# Patient Record
Sex: FEMALE | Race: OTHER | Employment: FULL TIME | URBAN - NONMETROPOLITAN AREA
[De-identification: names, ages, dates, MRNs, and addresses within clinical notes are randomized per-mention and may not be internally consistent; named-entity substitution may affect disease eponyms.]

---

## 2021-01-09 ENCOUNTER — OFFICE VISIT (OUTPATIENT)
Dept: PRIMARY CARE CLINIC | Age: 41
End: 2021-01-09
Payer: COMMERCIAL

## 2021-01-09 VITALS
BODY MASS INDEX: 40.05 KG/M2 | DIASTOLIC BLOOD PRESSURE: 78 MMHG | TEMPERATURE: 98.8 F | RESPIRATION RATE: 16 BRPM | OXYGEN SATURATION: 100 % | HEART RATE: 98 BPM | SYSTOLIC BLOOD PRESSURE: 105 MMHG | HEIGHT: 60 IN | WEIGHT: 204 LBS

## 2021-01-09 DIAGNOSIS — M62.830 BACK SPASM: Primary | ICD-10-CM

## 2021-01-09 PROCEDURE — 99203 OFFICE O/P NEW LOW 30 MIN: CPT | Performed by: FAMILY MEDICINE

## 2021-01-09 RX ORDER — CYCLOBENZAPRINE HCL 5 MG
5 TABLET ORAL NIGHTLY PRN
Qty: 30 TABLET | Refills: 0 | Status: SHIPPED | OUTPATIENT
Start: 2021-01-09 | End: 2021-08-09

## 2021-01-09 RX ORDER — PREDNISONE 20 MG/1
20 TABLET ORAL 2 TIMES DAILY
Qty: 10 TABLET | Refills: 0 | Status: SHIPPED | OUTPATIENT
Start: 2021-01-09 | End: 2021-01-14

## 2021-01-09 ASSESSMENT — PATIENT HEALTH QUESTIONNAIRE - PHQ9
SUM OF ALL RESPONSES TO PHQ QUESTIONS 1-9: 0
1. LITTLE INTEREST OR PLEASURE IN DOING THINGS: 0
SUM OF ALL RESPONSES TO PHQ QUESTIONS 1-9: 0
2. FEELING DOWN, DEPRESSED OR HOPELESS: 0
SUM OF ALL RESPONSES TO PHQ9 QUESTIONS 1 & 2: 0

## 2021-01-09 ASSESSMENT — ENCOUNTER SYMPTOMS
BACK PAIN: 1
COUGH: 0
WHEEZING: 0
CHEST TIGHTNESS: 0
BOWEL INCONTINENCE: 0
SHORTNESS OF BREATH: 0

## 2021-01-09 NOTE — PATIENT INSTRUCTIONS
Patient Education        Back Stretches: Exercises  Introduction  Here are some examples of exercises for stretching your back. Start each exercise slowly. Ease off the exercise if you start to have pain. Your doctor or physical therapist will tell you when you can start these exercises and which ones will work best for you. How to do the exercises  Overhead stretch   1. Stand comfortably with your feet shoulder-width apart. 2. Looking straight ahead, raise both arms over your head and reach toward the ceiling. Do not allow your head to tilt back. 3. Hold for 15 to 30 seconds, then lower your arms to your sides. 4. Repeat 2 to 4 times. Side stretch   1. Stand comfortably with your feet shoulder-width apart. 2. Raise one arm over your head, and then lean to the other side. 3. Slide your hand down your leg as you let the weight of your arm gently stretch your side muscles. Hold for 15 to 30 seconds. 4. Repeat 2 to 4 times on each side. Press-up   1. Lie on your stomach, supporting your body with your forearms. 2. Press your elbows down into the floor to raise your upper back. As you do this, relax your stomach muscles and allow your back to arch without using your back muscles. As your press up, do not let your hips or pelvis come off the floor. 3. Hold for 15 to 30 seconds, then relax. 4. Repeat 2 to 4 times. Relax and rest   1. Lie on your back with a rolled towel under your neck and a pillow under your knees. Extend your arms comfortably to your sides. 2. Relax and breathe normally. 3. Remain in this position for about 10 minutes. 4. If you can, do this 2 or 3 times each day. Follow-up care is a key part of your treatment and safety. Be sure to make and go to all appointments, and call your doctor if you are having problems. It's also a good idea to know your test results and keep a list of the medicines you take. Where can you learn more? Go to https://chpepiceweb.6fusion. org and sign in to your [employer] account. Enter Q184 in the M-SIXhire box to learn more about \"Back Stretches: Exercises. \"     If you do not have an account, please click on the \"Sign Up Now\" link. Current as of: March 2, 2020               Content Version: 12.6  © 9042-6324 [employer]. Care instructions adapted under license by [facility] ([facility]). If you have questions about a medical condition or this instruction, always ask your healthcare professional. [address] any warranty or liability for your use of this information. Patient Education        Acute Low Back Pain: Exercises  Introduction  Here are some examples of typical rehabilitation exercises for your condition. Start each exercise slowly. Ease off the exercise if you start to have pain. Your doctor or physical therapist will tell you when you can start these exercises and which ones will work best for you. When you are not being active, find a comfortable position for rest. Some people are comfortable on the floor or a medium-firm bed with a small pillow under their head and another under their knees. Some people prefer to lie on their side with a pillow between their knees. Don't stay in one position for too long. Take short walks (10 to 20 minutes) every 2 to 3 hours. Avoid slopes, hills, and stairs until you feel better. Walk only distances you can manage without pain, especially leg pain. How to do the exercises  Back stretches   1. Get down on your hands and knees on the floor. 2. Relax your head and allow it to droop. Round your back up toward the ceiling until you feel a nice stretch in your upper, middle, and lower back. Hold this stretch for as long as it feels comfortable, or about 15 to 30 seconds. 3. Return to the starting position with a flat back while you are on your hands and knees. 4. Let your back sway by pressing your stomach toward the floor. Lift your buttocks toward the ceiling. 5. Hold this position for 15 to 30 seconds. 6. Repeat 2 to 4 times. Follow-up care is a key part of your treatment and safety. Be sure to make and go to all appointments, and call your doctor if you are having problems. It's also a good idea to know your test results and keep a list of the medicines you take. Where can you learn more? Go to https://[email or web address].Hepregen. org and sign in to your [employer] account. Enter E576 in the [employer]onoCloud box to learn more about \"Acute Low Back Pain: Exercises. \"     If you do not have an account, please click on the \"Sign Up Now\" link. Current as of: March 2, 2020               Content Version: 12.6  © 1898-8729 [employer]. Care instructions adapted under license by [facility] ([facility]). If you have questions about a medical condition or this instruction, always ask your healthcare professional. [address] any warranty or liability for your use of this information.

## 2021-01-09 NOTE — PROGRESS NOTES
[address] [place] [address]  Dept: [phone]  Dept Fax: [phone]  Loc: [phone]    Amber King is a 47 y.o. female who presents today for her medical conditions/complaints as noted below. Amber King is c/o of   Chief Complaint   Patient presents with    Back Pain     Getting up out of a massage chair and could hardly get up afterwards. HPI:     Here today for back pain. Back Pain  This is a chronic problem. The current episode started yesterday (flared up yesterday). The problem occurs constantly. The pain is present in the thoracic spine and lumbar spine. The pain does not radiate. The pain is at a severity of 5/10. The pain is moderate. The symptoms are aggravated by lying down and position. Stiffness is present all day. Associated symptoms include numbness (in arms) and tingling (in arms). Pertinent negatives include no bladder incontinence, bowel incontinence, chest pain, fever, leg pain, paresis, paresthesias or weakness. Risk factors include obesity. She has tried NSAIDs (tylenol pm) for the symptoms. She works a machine at [employer]    History reviewed. No pertinent past medical history. Social History     Tobacco Use    Smoking status: Current Every Day Smoker     Packs/day: 0.50     Types: Cigarettes    Smokeless tobacco: Never Used   Substance Use Topics    Alcohol use: Not on file     Current Outpatient Medications   Medication Sig Dispense Refill    predniSONE (DELTASONE) 20 MG tablet Take 1 tablet by mouth 2 times daily for 5 days 10 tablet 0    cyclobenzaprine (FLEXERIL) 5 MG tablet Take 1 tablet by mouth nightly as needed for Muscle spasms 30 tablet 0     No current facility-administered medications for this visit.            Allergies   Allergen Reactions    Penicillins Hives, Shortness Of Breath and Swelling       Subjective:     Review of Systems Constitutional: Negative for activity change, appetite change, chills, fatigue and fever. Respiratory: Negative for cough, chest tightness, shortness of breath and wheezing. Cardiovascular: Negative for chest pain, palpitations and leg swelling. Gastrointestinal: Negative for bowel incontinence. Genitourinary: Negative for bladder incontinence and difficulty urinating. Musculoskeletal: Positive for back pain. Neurological: Positive for tingling (in arms) and numbness (in arms). Negative for dizziness, syncope, weakness, light-headedness and paresthesias. Objective:      Physical Exam  Vitals signs and nursing note reviewed. Constitutional:       General: She is not in acute distress. Appearance: She is well-developed. Eyes:      Conjunctiva/sclera: Conjunctivae normal.   Neck:      Thyroid: No thyroid mass. Cardiovascular:      Rate and Rhythm: Normal rate and regular rhythm. Heart sounds: Normal heart sounds. No murmur. Pulmonary:      Effort: Pulmonary effort is normal. No respiratory distress. Breath sounds: Normal breath sounds. No wheezing or rales. Abdominal:      General: Bowel sounds are normal. There is no distension. Palpations: Abdomen is soft. Tenderness: There is no abdominal tenderness. There is no guarding. Musculoskeletal: Normal range of motion. Cervical back: She exhibits spasm. Lumbar back: She exhibits spasm. She exhibits normal range of motion, no tenderness, no bony tenderness, no swelling and no edema. Comments: Modified straight leg raise test was negative bilaterally   Skin:     General: Skin is warm and dry. Findings: No rash. Neurological:      Mental Status: She is alert and oriented to person, place, and time. Sensory: No sensory deficit.       Coordination: Coordination normal.      Gait: Gait normal.      Deep Tendon Reflexes:      Reflex Scores: Bicep reflexes are 2+ on the right side and 2+ on the left side. Patellar reflexes are 2+ on the right side and 2+ on the left side. Psychiatric:         Behavior: Behavior normal.         Judgment: Judgment normal.       /78 (Site: Right Upper Arm, Position: Sitting, Cuff Size: Large Adult)   Pulse 98   Temp 98.8 °F (37.1 °C) (Temporal)   Resp 16   Ht 5' (1.524 m)   Wt 204 lb (92.5 kg)   SpO2 100%   BMI 39.84 kg/m²     Assessment:       Diagnosis Orders   1. Back spasm               Plan:        Back spasm: new; I explained that I do not like to treat back pain with narcotics. I recommended steriods, heat, ice and back exercises. she was given back exercises to take home. I also recommended an occasional muscle relaxer at bedtime if she needs it. I also talked about the importance of good posture and staying active. Return if symptoms worsen or fail to improve. Orders Placed This Encounter   Medications    predniSONE (DELTASONE) 20 MG tablet     Sig: Take 1 tablet by mouth 2 times daily for 5 days     Dispense:  10 tablet     Refill:  0    cyclobenzaprine (FLEXERIL) 5 MG tablet     Sig: Take 1 tablet by mouth nightly as needed for Muscle spasms     Dispense:  30 tablet     Refill:  0       Patientgiven educational materials - see patient instructions. Discussed use, benefit,and side effects of prescribed medications. All patient questions answered. Ptvoiced understanding. Reviewed health maintenance. Instructed to continue currentmedications, diet and exercise. Patient agreed with treatment plan. Follow up asdirected.      Electronically signed by Pearl Baird MD on 1/9/2021 at 2:21 PM

## 2021-03-02 ENCOUNTER — APPOINTMENT (OUTPATIENT)
Dept: CT IMAGING | Age: 41
End: 2021-03-02
Payer: COMMERCIAL

## 2021-03-02 ENCOUNTER — OFFICE VISIT (OUTPATIENT)
Dept: PRIMARY CARE CLINIC | Age: 41
End: 2021-03-02
Payer: COMMERCIAL

## 2021-03-02 ENCOUNTER — HOSPITAL ENCOUNTER (EMERGENCY)
Age: 41
Discharge: HOME | End: 2021-03-02
Attending: EMERGENCY MEDICINE
Payer: COMMERCIAL

## 2021-03-02 VITALS
BODY MASS INDEX: 40.25 KG/M2 | OXYGEN SATURATION: 98 % | HEIGHT: 60 IN | SYSTOLIC BLOOD PRESSURE: 108 MMHG | TEMPERATURE: 97.5 F | DIASTOLIC BLOOD PRESSURE: 68 MMHG | RESPIRATION RATE: 16 BRPM | HEART RATE: 84 BPM | WEIGHT: 205 LBS

## 2021-03-02 VITALS
WEIGHT: 205 LBS | DIASTOLIC BLOOD PRESSURE: 90 MMHG | HEIGHT: 60 IN | RESPIRATION RATE: 14 BRPM | BODY MASS INDEX: 40.25 KG/M2 | TEMPERATURE: 98.7 F | SYSTOLIC BLOOD PRESSURE: 120 MMHG | OXYGEN SATURATION: 98 % | HEART RATE: 78 BPM

## 2021-03-02 DIAGNOSIS — R11.10 VOMITING IN ADULT: Primary | ICD-10-CM

## 2021-03-02 DIAGNOSIS — R11.2 NAUSEA AND VOMITING, INTRACTABILITY OF VOMITING NOT SPECIFIED, UNSPECIFIED VOMITING TYPE: Primary | ICD-10-CM

## 2021-03-02 DIAGNOSIS — K29.00 ACUTE GASTRITIS, PRESENCE OF BLEEDING UNSPECIFIED, UNSPECIFIED GASTRITIS TYPE: ICD-10-CM

## 2021-03-02 LAB
-: ABNORMAL
ABSOLUTE EOS #: 0.26 K/UL (ref 0–0.44)
ABSOLUTE IMMATURE GRANULOCYTE: 0.05 K/UL (ref 0–0.3)
ABSOLUTE LYMPH #: 3.97 K/UL (ref 1.1–3.7)
ABSOLUTE MONO #: 1 K/UL (ref 0.1–1.2)
ALBUMIN SERPL-MCNC: 4.3 G/DL (ref 3.5–5.2)
ALBUMIN/GLOBULIN RATIO: 1.7 (ref 1–2.5)
ALP BLD-CCNC: 67 U/L (ref 35–104)
ALT SERPL-CCNC: 22 U/L (ref 5–33)
AMORPHOUS: ABNORMAL
AMYLASE: 44 U/L (ref 28–100)
ANION GAP SERPL CALCULATED.3IONS-SCNC: 10 MMOL/L (ref 9–17)
AST SERPL-CCNC: 19 U/L
BACTERIA: ABNORMAL
BASOPHILS # BLD: 0 % (ref 0–2)
BASOPHILS ABSOLUTE: 0.04 K/UL (ref 0–0.2)
BILIRUB SERPL-MCNC: 0.26 MG/DL (ref 0.3–1.2)
BILIRUBIN DIRECT: <0.08 MG/DL
BILIRUBIN URINE: NEGATIVE
BILIRUBIN, INDIRECT: ABNORMAL MG/DL (ref 0–1)
BUN BLDV-MCNC: 15 MG/DL (ref 6–20)
BUN/CREAT BLD: 15 (ref 9–20)
CALCIUM SERPL-MCNC: 9.6 MG/DL (ref 8.6–10.4)
CASTS UA: ABNORMAL /LPF (ref 0–2)
CHLORIDE BLD-SCNC: 102 MMOL/L (ref 98–107)
CO2: 29 MMOL/L (ref 20–31)
COLOR: ABNORMAL
COMMENT UA: ABNORMAL
CREAT SERPL-MCNC: 0.98 MG/DL (ref 0.5–0.9)
CRYSTALS, UA: ABNORMAL /HPF
DIFFERENTIAL TYPE: ABNORMAL
EOSINOPHILS RELATIVE PERCENT: 2 % (ref 1–4)
EPITHELIAL CELLS UA: ABNORMAL /HPF (ref 0–5)
GFR AFRICAN AMERICAN: >60 ML/MIN
GFR NON-AFRICAN AMERICAN: >60 ML/MIN
GFR SERPL CREATININE-BSD FRML MDRD: ABNORMAL ML/MIN/{1.73_M2}
GFR SERPL CREATININE-BSD FRML MDRD: ABNORMAL ML/MIN/{1.73_M2}
GLOBULIN: 2.6 G/DL (ref 1.5–3.8)
GLUCOSE BLD-MCNC: 106 MG/DL (ref 70–99)
GLUCOSE URINE: NEGATIVE
HCG QUALITATIVE: NEGATIVE
HCT VFR BLD CALC: 48.7 % (ref 36.3–47.1)
HEMOGLOBIN: 15.3 G/DL (ref 11.9–15.1)
IMMATURE GRANULOCYTES: 0 %
KETONES, URINE: NEGATIVE
LACTIC ACID: 0.7 MMOL/L (ref 0.5–2.2)
LEUKOCYTE ESTERASE, URINE: NEGATIVE
LIPASE: 71 U/L (ref 13–60)
LYMPHOCYTES # BLD: 33 % (ref 24–43)
MCH RBC QN AUTO: 29.7 PG (ref 25.2–33.5)
MCHC RBC AUTO-ENTMCNC: 31.4 G/DL (ref 25.2–33.5)
MCV RBC AUTO: 94.4 FL (ref 82.6–102.9)
MONOCYTES # BLD: 8 % (ref 3–12)
MUCUS: ABNORMAL
NITRITE, URINE: NEGATIVE
NRBC AUTOMATED: 0 PER 100 WBC
OTHER OBSERVATIONS UA: ABNORMAL
PDW BLD-RTO: 12.6 % (ref 11.8–14.4)
PH UA: 7.5 (ref 5–6)
PLATELET # BLD: 279 K/UL (ref 138–453)
PLATELET ESTIMATE: ABNORMAL
PMV BLD AUTO: 9.7 FL (ref 8.1–13.5)
POTASSIUM SERPL-SCNC: 3.3 MMOL/L (ref 3.7–5.3)
PROTEIN UA: NEGATIVE
RBC # BLD: 5.16 M/UL (ref 3.95–5.11)
RBC # BLD: ABNORMAL 10*6/UL
RBC UA: ABNORMAL /HPF (ref 0–4)
RENAL EPITHELIAL, UA: ABNORMAL /HPF
SEG NEUTROPHILS: 57 % (ref 36–65)
SEGMENTED NEUTROPHILS ABSOLUTE COUNT: 6.78 K/UL (ref 1.5–8.1)
SODIUM BLD-SCNC: 141 MMOL/L (ref 135–144)
SPECIFIC GRAVITY UA: 1.01 (ref 1.01–1.02)
TOTAL PROTEIN: 6.9 G/DL (ref 6.4–8.3)
TRICHOMONAS: ABNORMAL
TROPONIN INTERP: NORMAL
TROPONIN T: NORMAL NG/ML
TROPONIN, HIGH SENSITIVITY: <6 NG/L (ref 0–14)
TURBIDITY: ABNORMAL
URINE HGB: NEGATIVE
UROBILINOGEN, URINE: NORMAL
WBC # BLD: 12.1 K/UL (ref 3.5–11.3)
WBC # BLD: ABNORMAL 10*3/UL
WBC UA: ABNORMAL /HPF (ref 0–4)
YEAST: ABNORMAL

## 2021-03-02 PROCEDURE — 93005 ELECTROCARDIOGRAM TRACING: CPT | Performed by: EMERGENCY MEDICINE

## 2021-03-02 PROCEDURE — 85025 COMPLETE CBC W/AUTO DIFF WBC: CPT

## 2021-03-02 PROCEDURE — 6360000002 HC RX W HCPCS: Performed by: EMERGENCY MEDICINE

## 2021-03-02 PROCEDURE — 84484 ASSAY OF TROPONIN QUANT: CPT

## 2021-03-02 PROCEDURE — 6360000004 HC RX CONTRAST MEDICATION: Performed by: EMERGENCY MEDICINE

## 2021-03-02 PROCEDURE — 83605 ASSAY OF LACTIC ACID: CPT

## 2021-03-02 PROCEDURE — 80048 BASIC METABOLIC PNL TOTAL CA: CPT

## 2021-03-02 PROCEDURE — 2709999900 CT CHEST ABDOMEN PELVIS W CONTRAST

## 2021-03-02 PROCEDURE — 81001 URINALYSIS AUTO W/SCOPE: CPT

## 2021-03-02 PROCEDURE — 96375 TX/PRO/DX INJ NEW DRUG ADDON: CPT

## 2021-03-02 PROCEDURE — 83690 ASSAY OF LIPASE: CPT

## 2021-03-02 PROCEDURE — 96374 THER/PROPH/DIAG INJ IV PUSH: CPT

## 2021-03-02 PROCEDURE — 80076 HEPATIC FUNCTION PANEL: CPT

## 2021-03-02 PROCEDURE — 99284 EMERGENCY DEPT VISIT MOD MDM: CPT

## 2021-03-02 PROCEDURE — 82150 ASSAY OF AMYLASE: CPT

## 2021-03-02 PROCEDURE — 2580000003 HC RX 258: Performed by: EMERGENCY MEDICINE

## 2021-03-02 PROCEDURE — 84703 CHORIONIC GONADOTROPIN ASSAY: CPT

## 2021-03-02 RX ORDER — OMEPRAZOLE 20 MG/1
20 CAPSULE, DELAYED RELEASE ORAL DAILY
COMMUNITY

## 2021-03-02 RX ORDER — ONDANSETRON 4 MG/1
4 TABLET, ORALLY DISINTEGRATING ORAL EVERY 8 HOURS PRN
Qty: 20 TABLET | Refills: 0 | Status: SHIPPED | OUTPATIENT
Start: 2021-03-02

## 2021-03-02 RX ORDER — 0.9 % SODIUM CHLORIDE 0.9 %
1000 INTRAVENOUS SOLUTION INTRAVENOUS ONCE
Status: COMPLETED | OUTPATIENT
Start: 2021-03-02 | End: 2021-03-02

## 2021-03-02 RX ORDER — MORPHINE SULFATE 4 MG/ML
4 INJECTION, SOLUTION INTRAMUSCULAR; INTRAVENOUS ONCE
Status: COMPLETED | OUTPATIENT
Start: 2021-03-02 | End: 2021-03-02

## 2021-03-02 RX ORDER — ONDANSETRON 2 MG/ML
4 INJECTION INTRAMUSCULAR; INTRAVENOUS ONCE
Status: COMPLETED | OUTPATIENT
Start: 2021-03-02 | End: 2021-03-02

## 2021-03-02 RX ADMIN — MORPHINE SULFATE 4 MG: 4 INJECTION, SOLUTION INTRAMUSCULAR; INTRAVENOUS at 15:25

## 2021-03-02 RX ADMIN — IOPAMIDOL 100 ML: 755 INJECTION, SOLUTION INTRAVENOUS at 15:42

## 2021-03-02 RX ADMIN — SODIUM CHLORIDE 1000 ML: 9 INJECTION, SOLUTION INTRAVENOUS at 15:22

## 2021-03-02 RX ADMIN — ONDANSETRON 4 MG: 2 INJECTION INTRAMUSCULAR; INTRAVENOUS at 15:22

## 2021-03-02 ASSESSMENT — PAIN DESCRIPTION - LOCATION: LOCATION: THROAT

## 2021-03-02 ASSESSMENT — PAIN SCALES - GENERAL: PAINLEVEL_OUTOF10: 6

## 2021-03-02 ASSESSMENT — PAIN DESCRIPTION - ONSET: ONSET: ON-GOING

## 2021-03-02 NOTE — PROGRESS NOTES
Patient stated that she started vomitting bright red blood and it has now turned to a dark brown/black color. Says it hurts from her throat down to stomach and it is painful when she does have a bowel movement. Patient states she has not taken the omeprazole for a few days since she cannot keep anything down.

## 2021-03-02 NOTE — PROGRESS NOTES
[facility]             [address] [facility], [facility] [place], [address]                        Telephone [phone]             Fax [phone]       Wolf Oglesby  1980  MRN:  [id number]  Date of visit:  3/2/2021     Subjective:  Wolf Oglesby is a 47 y.o. female who presented to [facility] 3/2/2021 with complaints of:  Nausea & Vomiting (been vomitting blood 2 days ago, burning in stomach since last week)      She states that she has had abdominal pain for about a week. She has had nausea and vomiting for the past 2 days. She has had bloody emesis. She has been unable to eat or drink. Objective:  Vitals:    03/02/21 1330   BP: 108/68   Pulse: 84   Resp: 16   Temp: 97.5 °F (36.4 °C)   SpO2: 98%         Assessment and Plan:  Nausea and vomiting, intractability of vomiting not specified, unspecified vomiting type     I discussed with the patient that she would benefit from evaluation at the emergency room. The patient was in agreement, and she was transported by Urgent Care staff to [facility] ER.

## 2021-03-02 NOTE — ED PROVIDER NOTES
[address]      Pt Name: Wolf Oglesby  MRN: [id number]  [place] 1980  Date of evaluation: 3/2/2021      CHIEF COMPLAINT       Chief Complaint   Patient presents with    Hematemesis     pt states has been vomiting for 2 days, started having bloody vomit yesterday         HISTORY OF PRESENT ILLNESS      The patient presents with vomiting. She says she vomited some blood yesterday but not today. She said she vomited all day yesterday. The patient is a regular consumer of cannabis every night. She says that she feels a burning discomfort in her chest into the abdomen. She says it feels like heartburn. She has not noted any blood in her stool. She has not had a fever. She denies shortness of breath. Nothing makes her symptoms better or worse otherwise. REVIEW OF SYSTEMS       All systems reviewed and negative unless noted in HPI. The patient denies fever or constitutional symptoms. Denies vision change. Denies any sore throat or rhinorrhea. Denies any neck pain or stiffness. Denies shortness of breath. Vomiting with epigastric pain into the chest as noted in HPI. Denies any dysuria. Denies urinary frequency or hematuria. Denies musculoskeletal injury or pain. Denies any weakness, numbness or focal neurologic deficit. Denies any skin rash or edema. No recent psychiatric issues. Uses cannabis. No easy bruising or bleeding. Denies any polyuria, polydypsia or history of immunocompromise. PAST MEDICAL HISTORY    has no past medical history on file. SURGICAL HISTORY      has no past surgical history on file.     CURRENT MEDICATIONS       Previous Medications    CYCLOBENZAPRINE (FLEXERIL) 5 MG TABLET    Take 1 tablet by mouth nightly as needed for Muscle spasms    OMEPRAZOLE (PRILOSEC) 20 MG DELAYED RELEASE CAPSULE    Take 20 mg by mouth daily Took a few days ago because she cant keep anything down       ALLERGIES     is allergic to penicillins. FAMILY HISTORY     has no family status information on file. family history is not on file. SOCIAL HISTORY      reports that she has been smoking cigarettes. She started smoking about 21 years ago. She has been smoking about 0.50 packs per day for the past 0.00 years. She has never used smokeless tobacco. She reports current drug use. Drug: Marijuana. She reports that she does not drink alcohol. PHYSICAL EXAM     INITIAL VITALS:  height is 5' (1.524 m) and weight is 205 lb (93 kg). Her tympanic temperature is 98.7 °F (37.1 °C). Her blood pressure is 111/87 and her pulse is 78. Her respiration is 14 and oxygen saturation is 100%. The patient is alert and oriented, in no apparent distress. HEENT is atraumatic. Pupils are PERRL at 4 mm with normal extraocular motion. Mucous membranes moist.    Neck is supple. Heart sounds regular rate and rhythm with no gallops, murmurs, or rubs. Lungs clear, no wheezes, rales or rhonchi. Abdomen: soft, with mild discomfort in the epigastric area to palpation. No pulsatile mass. Normal bowel sounds are noted. No rebound or guarding. Musculoskeletal exam shows no evidence of trauma. Normal distal pulses in all extremities. Skin: no rash or edema. Neurological exam reveals cranial nerves 2 through 12 grossly intact. Patient has equal  and normal deep tendon reflexes. Psychiatric: no hallucinations or suicidal ideation. Lymphatics.:  No lymphadenopathy. DIFFERENTIAL DIAGNOSIS/ MDM:     Gastritis, esophagitis, vomiting, cyclic vomiting syndrome, dehydration, ACS    DIAGNOSTIC RESULTS     EKG: All EKG's are interpreted by the Emergency Department Physician who either signs or Co-signs this chart in the absence of a cardiologist.    Sinus 71 with no ischemia. Axis 57, , , . No old to compare.     RADIOLOGY:   I reviewed the radiologist interpretations:  CT CHEST ABDOMEN PELVIS W CONTRAST   Final Result   1. No acute infective or inflammatory process in the chest abdomen or pelvis. 2. A 1 cm focal sclerosis in T11 vertebral body probably bone island. This   may be further confirmed with nuclear medicine bone scan if deemed clinically   necessary.               CT CHEST ABDOMEN PELVIS W CONTRAST (Final result)  Result time 03/02/21 16:06:57  Final result by Patrick Grant MD (03/02/21 16:06:57)                Impression:    1. No acute infective or inflammatory process in the chest abdomen or pelvis. 2. A 1 cm focal sclerosis in T11 vertebral body probably bone island.  This   may be further confirmed with nuclear medicine bone scan if deemed clinically   necessary. Narrative:    EXAMINATION:   CT OF THE CHEST, ABDOMEN, AND PELVIS WITH CONTRAST 3/2/2021 3:42 pm     TECHNIQUE:   CT of the chest, abdomen and pelvis was performed with the administration of   intravenous contrast. Multiplanar reformatted images are provided for review. Dose modulation, iterative reconstruction, and/or weight based adjustment of   the mA/kV was utilized to reduce the radiation dose to as low as reasonably   achievable. COMPARISON:   No priors     HISTORY:   ORDERING SYSTEM PROVIDED HISTORY: vomiting with pain in chest and abdomen   TECHNOLOGIST PROVIDED HISTORY:   vomiting with pain in chest and abdomen     Decision Support Exception->Emergency Medical Condition (MA)   Reason for Exam: vomiting with pain in chest and abdomen   Acuity: Acute   Type of Exam: Initial     FINDINGS:     Chest:     Mediastinum:  Normal cardiac size.  Aorta enhances normally and is normal in   caliber.  The main pulmonary arteries are grossly normal.  No significant   mediastinal, hilar or axillary lymphadenopathy.  Thyroid gland shows no   significant abnormalities. Esophagus shows no significant abnormalities.  No   pneumomediastinum. Lungs/pleura:  There are no significant nodules or masses.  No focal consolidation.   There is no pleural effusion or pneumothorax.  Normal   tracheobronchial tree. Soft Tissues/Bones: 1 cm sclerotic focus in T11 vertebral body likely bone   island. Abdomen/Pelvis:     Organs: The liver, spleen, pancreas, kidneys and adrenal glands show no significant   abnormality.  Gallbladder shows no significant abnormality. GI/Bowel: There is limited evaluation due to absence of oral contrast.     The stomach shows no focal lesions.  Small bowel loops normal in caliber   showing no focal abnormalities. Normal appendix. Evaluation of the colon shows no acute process. Pelvis: Pelvic organs unremarkable. Peritoneum/Retroperitoneum: No free intraperitoneal fluid or significant   lymphadenopathy.  Vascular structures enhance satisfactorily. Bones/Soft Tissues: No acute abnormality of the bones.  The superficial soft   tissues show no significant abnormalities.                      LABS:  Results for orders placed or performed during the hospital encounter of 03/02/21   Lactic Acid   Result Value Ref Range    Lactic Acid 0.7 0.5 - 2.2 mmol/L   CBC Auto Differential   Result Value Ref Range    WBC 12.1 (H) 3.5 - 11.3 k/uL    RBC 5.16 (H) 3.95 - 5.11 m/uL    Hemoglobin 15.3 (H) 11.9 - 15.1 g/dL    Hematocrit 48.7 (H) 36.3 - 47.1 %    MCV 94.4 82.6 - 102.9 fL    MCH 29.7 25.2 - 33.5 pg    MCHC 31.4 25.2 - 33.5 g/dL    RDW 12.6 11.8 - 14.4 %    Platelets 111 663 - 194 k/uL    MPV 9.7 8.1 - 13.5 fL    NRBC Automated 0.0 0.0 per 100 WBC    Differential Type NOT REPORTED     Seg Neutrophils 57 36 - 65 %    Lymphocytes 33 24 - 43 %    Monocytes 8 3 - 12 %    Eosinophils % 2 1 - 4 %    Basophils 0 0 - 2 %    Immature Granulocytes 0 0 %    Segs Absolute 6.78 1.50 - 8.10 k/uL    Absolute Lymph # 3.97 (H) 1.10 - 3.70 k/uL    Absolute Mono # 1.00 0.10 - 1.20 k/uL    Absolute Eos # 0.26 0.00 - 0.44 k/uL    Basophils Absolute 0.04 0.00 - 0.20 k/uL    Absolute Immature Granulocyte 0. 05 0.00 - 0.30 k/uL    WBC Morphology NOT REPORTED     RBC Morphology NOT REPORTED     Platelet Estimate NOT REPORTED    Basic Metabolic Panel   Result Value Ref Range    Glucose 106 (H) 70 - 99 mg/dL    BUN 15 6 - 20 mg/dL    CREATININE 0.98 (H) 0.50 - 0.90 mg/dL    Bun/Cre Ratio 15 9 - 20    Calcium 9.6 8.6 - 10.4 mg/dL    Sodium 141 135 - 144 mmol/L    Potassium 3.3 (L) 3.7 - 5.3 mmol/L    Chloride 102 98 - 107 mmol/L    CO2 29 20 - 31 mmol/L    Anion Gap 10 9 - 17 mmol/L    GFR Non-African American >60 >60 mL/min    GFR African American >60 >60 mL/min    GFR Comment          GFR Staging NOT REPORTED    Hepatic Function Panel   Result Value Ref Range    Albumin 4.3 3.5 - 5.2 g/dL    Alkaline Phosphatase 67 35 - 104 U/L    ALT 22 5 - 33 U/L    AST 19 <32 U/L    Total Bilirubin 0.26 (L) 0.3 - 1.2 mg/dL    Bilirubin, Direct <0.08 <0.31 mg/dL    Bilirubin, Indirect CANNOT BE CALCULATED 0.00 - 1.00 mg/dL    Total Protein 6.9 6.4 - 8.3 g/dL    Globulin 2.6 1.5 - 3.8 g/dL    Albumin/Globulin Ratio 1.7 1.0 - 2.5   Lipase   Result Value Ref Range    Lipase 71 (H) 13 - 60 U/L   Amylase   Result Value Ref Range    Amylase 44 28 - 100 U/L   Troponin   Result Value Ref Range    Troponin, High Sensitivity <6 0 - 14 ng/L    Troponin T NOT REPORTED <0.03 ng/mL    Troponin Interp NOT REPORTED    Urinalysis Reflex to Culture    Specimen: Urine, clean catch   Result Value Ref Range    Color, UA NOT REPORTED YELLOW    Turbidity UA NOT REPORTED CLEAR    Glucose, Ur NEGATIVE NEGATIVE    Bilirubin Urine NEGATIVE NEGATIVE    Ketones, Urine NEGATIVE NEGATIVE    Specific Gravity, UA 1.015 1.010 - 1.025    Urine Hgb NEGATIVE NEGATIVE    pH, UA 7.5 (H) 5.0 - 6.0    Protein, UA NEGATIVE NEGATIVE    Urobilinogen, Urine Normal Normal    Nitrite, Urine NEGATIVE NEGATIVE    Leukocyte Esterase, Urine NEGATIVE NEGATIVE    Urinalysis Comments NOT REPORTED    HCG Qualitative, Serum   Result Value Ref Range    hCG Qual NEGATIVE NEGATIVE Microscopic Urinalysis   Result Value Ref Range    -          WBC, UA 0 TO 4 0 - 4 /HPF    RBC, UA 0 TO 4 0 - 4 /HPF    Casts UA NOT REPORTED 0 - 2 /LPF    Crystals, UA NOT REPORTED None /HPF    Epithelial Cells UA 0 TO 4 0 - 5 /HPF    Renal Epithelial, UA NOT REPORTED 0 /HPF    Bacteria, UA 3+ (A) None    Mucus, UA NOT REPORTED None    Trichomonas, UA NOT REPORTED None    Amorphous, UA 3+ (A) None    Other Observations UA NOT REPORTED NOT REQ. Yeast, UA NOT REPORTED None   EKG 12 Lead   Result Value Ref Range    Ventricular Rate 71 BPM    Atrial Rate 71 BPM    P-R Interval 122 ms    QRS Duration 100 ms    Q-T Interval 404 ms    QTc Calculation (Bazett) 439 ms    P Axis 60 degrees    R Axis 57 degrees    T Axis 21 degrees         EMERGENCY DEPARTMENT COURSE:   Vitals:    Vitals:    03/02/21 1421   BP: 111/87   Pulse: 78   Resp: 14   Temp: 98.7 °F (37.1 °C)   TempSrc: Tympanic   SpO2: 100%   Weight: 205 lb (93 kg)   Height: 5' (1.524 m)     -------------------------  BP: 111/87, Temp: 98.7 °F (37.1 °C), Pulse: 78, Resp: 14      Re-evaluation Notes    The patient has had no further vomiting and is feeling improved. I will write for Zofran. I did discuss the she may wish to decrease her marijuana use. I given her referral to a surgeon and the PCP. She may continue her acid reducing medication that she is already prescribed. The patient is discharged in good condition. FINAL IMPRESSION      1. Vomiting in adult    2.  Acute gastritis, presence of bleeding unspecified, unspecified gastritis type          DISPOSITION/PLAN   DISPOSITION        Condition on Disposition    good    PATIENT REFERRED TO:  Josh Mckeon, DO  [address] [facility]  [phone]    In 2 days      Thais Simpson DO  [address] [address]  [phone]    In 2 days        DISCHARGE MEDICATIONS:  New Prescriptions    ONDANSETRON (ZOFRAN ODT) 4 MG DISINTEGRATING TABLET    Take 1 tablet by mouth every 8 hours as needed for Nausea       (Please note that portions of this note were completed with a voice recognition program.  Efforts were made to edit the dictations but occasionally words are mis-transcribed.)    Yen MD   Attending Emergency Physician         Michael Harrell MD  03/02/21 1916

## 2021-03-02 NOTE — LETTER
[facility]  [address]  Phone: [phone]               March 2, 2021    Patient: Amber King   YOB: 1980   Date of Visit: 3/2/2021       To Whom It May Concern:    Amber King was seen and treated in our emergency department on 3/2/2021. She may return to work on 03/04/21.       Sincerely,       Brandy Winkler MD         Signature:__________________________________

## 2021-03-03 LAB
EKG ATRIAL RATE: 71 BPM
EKG P AXIS: 60 DEGREES
EKG P-R INTERVAL: 122 MS
EKG Q-T INTERVAL: 404 MS
EKG QRS DURATION: 100 MS
EKG QTC CALCULATION (BAZETT): 439 MS
EKG R AXIS: 57 DEGREES
EKG T AXIS: 21 DEGREES
EKG VENTRICULAR RATE: 71 BPM

## 2021-03-09 ENCOUNTER — INITIAL CONSULT (OUTPATIENT)
Dept: SURGERY | Age: 41
End: 2021-03-09
Payer: COMMERCIAL

## 2021-03-09 VITALS
SYSTOLIC BLOOD PRESSURE: 126 MMHG | DIASTOLIC BLOOD PRESSURE: 80 MMHG | OXYGEN SATURATION: 95 % | HEART RATE: 87 BPM | BODY MASS INDEX: 39.62 KG/M2 | RESPIRATION RATE: 16 BRPM | HEIGHT: 60 IN | WEIGHT: 201.8 LBS

## 2021-03-09 DIAGNOSIS — K92.0 HEMATEMESIS WITH NAUSEA: ICD-10-CM

## 2021-03-09 DIAGNOSIS — R11.2 NAUSEA AND VOMITING, INTRACTABILITY OF VOMITING NOT SPECIFIED, UNSPECIFIED VOMITING TYPE: Primary | ICD-10-CM

## 2021-03-09 DIAGNOSIS — Z01.818 PRE-OP TESTING: Primary | ICD-10-CM

## 2021-03-09 PROCEDURE — 99203 OFFICE O/P NEW LOW 30 MIN: CPT | Performed by: SURGERY

## 2021-03-09 NOTE — PROGRESS NOTES
Subjective   Gisele Roche is a 47 y.o. female who presents today for dilation of nausea, vomiting and hematemesis. Patient states for approximately the past 3 to 4 weeks she has been having ongoing daily nausea and emesis. This seems to be caused by anything that she eats and states that at least on a couple occasions even just drinking water has caused her to have emesis. More recently on 3/2 the patient came to ER due to her symptoms and on that day also had an episode of hematemesis which she states started as bright red blood and started to become more dark. She had work-up in the ER that did not show any acute processes and she was discharged with Zofran. The patient has been on over-the-counter Prilosec as well as Tums as needed. Prior to 3 to 4 weeks ago she was not having any issues. Denies any changes in bowel movements. No unintended weight loss. Family history unavailable due to adoption. The patient does reports tobacco use both in the form of cigarettes and vaping. Does not use alcohol. The patient does smoke marijuana daily and states that she has been doing this for approximately 20 years. Other than eating she has not identified any other inciting causes of her symptoms. No recent sick contacts. No recent travel. No other known exposures. She does report some mild cramping pain in the left upper quadrant. Otherwise no abdominal pain. Past Medical History:   Diagnosis Date    ADHD     Asthma     Dysplasia of cervix     GERD (gastroesophageal reflux disease)     Hypoglycemia     PCOS (polycystic ovarian syndrome)     PTSD (post-traumatic stress disorder)        No past surgical history on file.     Current Outpatient Medications   Medication Sig Dispense Refill    omeprazole (PRILOSEC) 20 MG delayed release capsule Take 20 mg by mouth daily Took a few days ago because she cant keep anything down      ondansetron (ZOFRAN ODT) 4 MG disintegrating tablet Take 1 tablet by mouth every 8 hours as needed for Nausea 20 tablet 0    cyclobenzaprine (FLEXERIL) 5 MG tablet Take 1 tablet by mouth nightly as needed for Muscle spasms 30 tablet 0     No current facility-administered medications for this visit. Allergies   Allergen Reactions    Penicillins Hives, Shortness Of Breath and Swelling       No family history on file.     Social History     Socioeconomic History    Marital status: Single     Spouse name: Not on file    Number of children: Not on file    Years of education: Not on file    Highest education level: Not on file   Occupational History    Not on file   Social Needs    Financial resource strain: Not on file    Food insecurity     Worry: Not on file     Inability: Not on file    Transportation needs     Medical: Not on file     Non-medical: Not on file   Tobacco Use    Smoking status: Current Every Day Smoker     Packs/day: 0.50     Years: 0.00     Pack years: 0.00     Types: Cigarettes     Start date: 3/2/2000    Smokeless tobacco: Never Used   Substance and Sexual Activity    Alcohol use: Never     Frequency: Never    Drug use: Yes     Types: Marijuana    Sexual activity: Not on file   Lifestyle    Physical activity     Days per week: Not on file     Minutes per session: Not on file    Stress: Not on file   Relationships    Social connections     Talks on phone: Not on file     Gets together: Not on file     Attends Caodaism service: Not on file     Active member of club or organization: Not on file     Attends meetings of clubs or organizations: Not on file     Relationship status: Not on file    Intimate partner violence     Fear of current or ex partner: Not on file     Emotionally abused: Not on file     Physically abused: Not on file     Forced sexual activity: Not on file   Other Topics Concern    Not on file   Social History Narrative    Not on file       ROS:   Review of Systems - General ROS: negative  Psychological ROS: negative  Ophthalmic ROS: negative  ENT ROS: negative  Respiratory ROS: no cough, shortness of breath, or wheezing  Cardiovascular ROS: no chest pain or dyspnea on exertion  Gastrointestinal ROS: per HPI  Genito-Urinary ROS: no dysuria, trouble voiding, or hematuria  Musculoskeletal ROS: negative  Dermatological ROS: negative      Objective   Vitals:    03/09/21 1435   BP: 126/80   Pulse: 87   Resp: 16   SpO2: 95%     General:in no apparent distress and well developed and well nourished  Eyes: No gross abnormalities. Ears, Nose, Throat: hearing grossly normal bilaterally  Neck: neck supple and non tender without mass  Lungs: clear to auscultation without wheezes or rales   Heart: S1S2, no mumurs, RRR  Abdomen: soft, nontender, no HSM, no guarding, no rebound, no masses  Extremity: negative  Neuro: CN II-XII grossly intact      Assessment     3  57-year-old female with recent daily nausea and vomiting with 1 episode of hematemesis      Plan     1. At this point I do have concern for ulcer disease versus other acute gastric pathology. Recommending patient undergo EGD for further evaluation. Risks of the procedure including bleeding, infection, perforation, need for further surgery and anesthesia risk were discussed and consent is obtained. Patient will remain on her PPI therapy and can use Zofran as needed. 2.  I did at least start conversation about the role of chronic cannabinoid use as it relates to intractable nausea and vomiting with cannabinoid induced hyperemesis. As this is a diagnosis of exclusion will rule out other pathology before discussion further.     Electronically signed by Annalisa Nath DO on 3/9/2021 at 3:03 PM      (Please note that portions of this note were completed with a voice recognition program.  Efforts were made to edit the dictations but occasionally words are mis-transcribed.)

## 2021-03-09 NOTE — PATIENT INSTRUCTIONS
Patient Education   Patient Education        Upper GI Endoscopy: Before Your Procedure  What is an upper GI endoscopy? An upper gastrointestinal (or GI) endoscopy is a test that allows your doctor to look at the inside of your esophagus, stomach, and the first part of your small intestine, called the duodenum. The esophagus is the tube that carries food to your stomach. The doctor uses a thin, lighted tube that bends. It is called an endoscope, or scope. The doctor puts the tip of the scope in your mouth and gently moves it down your throat. The scope is a flexible video camera. The doctor looks at a monitor (like a TV set or a computer screen) as he or she moves the scope. A doctor may do this procedure to look for ulcers, tumors, infection, or bleeding. It also can be used to look for signs of acid backing up into your esophagus. This is called gastroesophageal reflux disease, or GERD. The doctor can use the scope to take a sample of tissue for study (a biopsy). The doctor also can use the scope to take out growths or stop bleeding. Follow-up care is a key part of your treatment and safety. Be sure to make and go to all appointments, and call your doctor if you are having problems. It's also a good idea to know your test results and keep a list of the medicines you take. How do you prepare for the procedure? Procedures can be stressful. This information will help you understand what you can expect. And it will help you safely prepare for your procedure. Preparing for the procedure    · Do not eat or drink anything for 6 to 8 hours before the test. An empty stomach helps your doctor see your stomach clearly during the test. It also reduces your chances of vomiting. If you vomit, there is a small risk that the vomit could enter your lungs.  (This is called aspiration.) If the test is done in an emergency, a tube may be inserted through your nose or mouth to empty your stomach.     · Do not take sucralfate (Carafate) or antacids on the day of the test. These medicines can make it hard for your doctor to see your upper GI tract.     · If your doctor tells you to, stop taking iron supplements 7 to 14 days before the test.     · Be sure you have someone to take you home. Anesthesia and pain medicine will make it unsafe for you to drive or get home on your own.     · Understand exactly what procedure is planned, along with the risks, benefits, and other options. · Tell your doctor ALL the medicines, vitamins, supplements, and herbal remedies you take. Some may increase the risk of problems during your procedure. Your doctor will tell you if you should stop taking any of them before the procedure and how soon to do it.     · If you take aspirin or some other blood thinner, ask your doctor if you should stop taking it before your procedure. Make sure that you understand exactly what your doctor wants you to do. These medicines increase the risk of bleeding.     · Make sure your doctor and the hospital have a copy of your advance directive. If you don't have one, you may want to prepare one. It lets others know your health care wishes. It's a good thing to have before any type of surgery or procedure. What happens on the day of the procedure? · Follow the instructions exactly about when to stop eating and drinking. If you don't, your procedure may be canceled. If your doctor told you to take your medicines on the day of the procedure, take them with only a sip of water.     · Take a bath or shower before you come in for your procedure. Do not apply lotions, perfumes, deodorants, or nail polish.     · Take off all jewelry and piercings. And take out contact lenses, if you wear them. At the hospital or surgery center   · Bring a picture ID.     · The test may take 15 to 30 minutes.     · The doctor may spray medicine on the back of your throat to numb it. You also will get medicine to prevent pain and to relax you.   · You will lie on your left side. The doctor will put the scope in your mouth and toward the back of your throat. The doctor will tell you when to swallow. This helps the scope move down your throat. You will be able to breathe normally. The doctor will move the scope down your esophagus into your stomach. The doctor also may look at the duodenum.     · If your doctor wants to take a sample of tissue for a biopsy, he or she may use small surgical tools, which are put into the scope, to cut off some tissue. You will not feel a biopsy, if one is taken. The doctor also can use the tools to stop bleeding or to do other treatments, if needed.     · You will stay at the hospital or surgery center for 1 to 2 hours until the medicine you were given wears off. What happens after an upper GI endoscopy? · After the test, you may belch and feel bloated for a while.     · You may have a tickling, dry throat or mouth. You may feel a bit hoarse, and you may have a mild sore throat. These symptoms may last several days. Throat lozenges and warm saltwater gargles can help relieve the throat symptoms.     · Don't drive or operate machinery for 12 hours after the test.     · Your doctor will tell you when you can go back to your usual diet and activities.     · Don't drink alcohol for 12 to 24 hours after the test.   When should you call your doctor? · You have questions or concerns.     · You don't understand how to prepare for your procedure.     · You become ill before the procedure (such as fever, flu, or a cold).     · You need to reschedule or have changed your mind about having the procedure. Where can you learn more? Go to [email or web address]://[email or web address] org and sign in to your PredictAd account. Enter P790 in the Wintegra box to learn more about \"Upper GI Endoscopy: Before Your Procedure. \"     If you do not have an account, please click on the \"Sign Up Now\" link.   Current as of: April 15, 2020               Content Version: 12.6  © 2006-2020 [employer]. Care instructions adapted under license by [facility] ([facility]). If you have questions about a medical condition or this instruction, always ask your healthcare professional. [address] any warranty or liability for your use of this information. Upper GI Endoscopy: What to Expect at [address] had an upper GI endoscopy. Your doctor used a thin, lighted tube that bends to look at the inside of your esophagus, your stomach, and the first part of the small intestine, called the duodenum. After you have an endoscopy, you will stay at the hospital or clinic for 1 to 2 hours. This will allow the medicine to wear off. You will be able to go home after your doctor or nurse checks to make sure that you're not having any problems. You may have to stay overnight if you had treatment during the test. You may have a sore throat for a day or two after the test.  This care sheet gives you a general idea about what to expect after the test.  How can you care for yourself at home? Activity   · Rest as much as you need to after you go home. · You should be able to go back to your usual activities the day after the test.  Diet   · Follow your doctor's directions for eating after the test.  · Drink plenty of fluids (unless your doctor has told you not to). Medications   · If you have a sore throat the day after the test, use an over-the-counter spray to numb your throat. Follow-up care is a key part of your treatment and safety. Be sure to make and go to all appointments, and call your doctor if you are having problems. It's also a good idea to know your test results and keep a list of the medicines you take. When should you call for help? Call 911 anytime you think you may need emergency care.  For example, call if:    · You passed out (lost consciousness).     · You have trouble breathing.     · You pass maroon or bloody stools. Call your doctor now or seek immediate medical care if:    · You have pain that does not get better after your take pain medicine.     · You have new or worse belly pain.     · You have blood in your stools.     · You are sick to your stomach and cannot keep fluids down.     · You have a fever.     · You cannot pass stools or gas. Watch closely for changes in your health, and be sure to contact your doctor if:    · Your throat still hurts after a day or two.     · You do not get better as expected. Where can you learn more? Go to https://Softec Internetpepiceweb.Rezolve. org and sign in to your BitMethod account. Enter D238 in the Fuzmo box to learn more about \"Upper GI Endoscopy: What to Expect at Home. \"     If you do not have an account, please click on the \"Sign Up Now\" link. Current as of: April 15, 2020               Content Version: 12.6  © 2006-2020 [employer]. Care instructions adapted under license by [facility] ([facility]). If you have questions about a medical condition or this instruction, always ask your healthcare professional. [place] [address] any warranty or liability for your use of this information.

## 2021-03-10 DIAGNOSIS — Z01.818 PRE-OP TESTING: Primary | ICD-10-CM

## 2021-03-15 LAB — HCG URINE: NEGATIVE

## 2021-03-23 ENCOUNTER — TELEPHONE (OUTPATIENT)
Dept: SURGERY | Age: 41
End: 2021-03-23

## 2021-06-15 ENCOUNTER — OFFICE VISIT (OUTPATIENT)
Dept: FAMILY MEDICINE CLINIC | Age: 41
End: 2021-06-15
Payer: COMMERCIAL

## 2021-06-15 VITALS
HEIGHT: 61 IN | RESPIRATION RATE: 16 BRPM | OXYGEN SATURATION: 98 % | BODY MASS INDEX: 33.27 KG/M2 | HEART RATE: 90 BPM | SYSTOLIC BLOOD PRESSURE: 110 MMHG | WEIGHT: 176.2 LBS | TEMPERATURE: 97.6 F | DIASTOLIC BLOOD PRESSURE: 70 MMHG

## 2021-06-15 DIAGNOSIS — R30.0 DYSURIA: ICD-10-CM

## 2021-06-15 DIAGNOSIS — N91.2 ABSENT MENSES: ICD-10-CM

## 2021-06-15 DIAGNOSIS — Z11.4 ENCOUNTER FOR SCREENING FOR HIV: ICD-10-CM

## 2021-06-15 DIAGNOSIS — Z11.59 NEED FOR HEPATITIS C SCREENING TEST: ICD-10-CM

## 2021-06-15 DIAGNOSIS — F17.200 SMOKER: ICD-10-CM

## 2021-06-15 DIAGNOSIS — Z13.31 POSITIVE DEPRESSION SCREENING: ICD-10-CM

## 2021-06-15 DIAGNOSIS — B07.0 PLANTAR WARTS: Primary | ICD-10-CM

## 2021-06-15 DIAGNOSIS — Z13.220 SCREENING FOR LIPID DISORDERS: ICD-10-CM

## 2021-06-15 DIAGNOSIS — L70.9 ACNE, UNSPECIFIED ACNE TYPE: ICD-10-CM

## 2021-06-15 DIAGNOSIS — F12.20 CANNABIS DEPENDENCE, DAILY USE (HCC): ICD-10-CM

## 2021-06-15 PROCEDURE — 99204 OFFICE O/P NEW MOD 45 MIN: CPT | Performed by: NURSE PRACTITIONER

## 2021-06-15 PROCEDURE — G8431 POS CLIN DEPRES SCRN F/U DOC: HCPCS | Performed by: NURSE PRACTITIONER

## 2021-06-15 RX ORDER — ACETAMINOPHEN 325 MG/1
650 TABLET ORAL EVERY 6 HOURS PRN
COMMUNITY

## 2021-06-15 RX ORDER — SENNA PLUS 8.6 MG/1
1 TABLET ORAL DAILY
COMMUNITY

## 2021-06-15 RX ORDER — SUCRALFATE 1 G/1
TABLET ORAL
COMMUNITY
Start: 2021-03-15

## 2021-06-15 ASSESSMENT — PATIENT HEALTH QUESTIONNAIRE - PHQ9
7. TROUBLE CONCENTRATING ON THINGS, SUCH AS READING THE NEWSPAPER OR WATCHING TELEVISION: 3
6. FEELING BAD ABOUT YOURSELF - OR THAT YOU ARE A FAILURE OR HAVE LET YOURSELF OR YOUR FAMILY DOWN: 2
SUM OF ALL RESPONSES TO PHQ9 QUESTIONS 1 & 2: 3
3. TROUBLE FALLING OR STAYING ASLEEP: 3
8. MOVING OR SPEAKING SO SLOWLY THAT OTHER PEOPLE COULD HAVE NOTICED. OR THE OPPOSITE, BEING SO FIGETY OR RESTLESS THAT YOU HAVE BEEN MOVING AROUND A LOT MORE THAN USUAL: 3
10. IF YOU CHECKED OFF ANY PROBLEMS, HOW DIFFICULT HAVE THESE PROBLEMS MADE IT FOR YOU TO DO YOUR WORK, TAKE CARE OF THINGS AT HOME, OR GET ALONG WITH OTHER PEOPLE: 2
1. LITTLE INTEREST OR PLEASURE IN DOING THINGS: 1
SUM OF ALL RESPONSES TO PHQ QUESTIONS 1-9: 20
4. FEELING TIRED OR HAVING LITTLE ENERGY: 3
5. POOR APPETITE OR OVEREATING: 3
SUM OF ALL RESPONSES TO PHQ QUESTIONS 1-9: 20
9. THOUGHTS THAT YOU WOULD BE BETTER OFF DEAD, OR OF HURTING YOURSELF: 0
SUM OF ALL RESPONSES TO PHQ QUESTIONS 1-9: 20
2. FEELING DOWN, DEPRESSED OR HOPELESS: 2

## 2021-06-15 NOTE — PATIENT INSTRUCTIONS
Patient Education        benzoyl peroxide topical  Pronunciation:  NEPTALI zoyl per OX omayra  Brand:  Acne-Clear, Benzac AC, BenzePrO, Benziq, Brevoxyl Acne Wash Kit, Clearskin, Fostex Wash 10%, NeoBenz Micro, Neutrogena Acne Mask, Oscion, Oxy Daily Wash, Oxy-10, Pacnex, PanOxyl, Persa-Gel, Riax, SoluCLENZ Rx, Romaine Kill  What is the most important information I should know about benzoyl peroxide topical?  Benzoyl peroxide can cause a rare but serious allergic reaction or severe skin irritation. Stop using this medicine and get emergency medical help if you have: hives, itching; difficult breathing, feeling light-headed; or swelling of your face, lips, tongue, or throat. What is benzoyl peroxide topical?  Benzoyl peroxide has an antibacterial effect. It also has a mild drying effect, which allows excess oils and dirt to be easily washed away from the skin. Benzoyl peroxide topical (for the skin) is used to treat acne. There are many brands and forms of benzoyl peroxide available. Not all brands are listed on this leaflet. Benzoyl peroxide topical may also be used for purposes not listed in this medication guide. What should I discuss with my healthcare provider before using benzoyl peroxide topical?  You should not use benzoyl peroxide if you are allergic to it, or if you have:  · very sensitive skin. Ask a doctor or pharmacist if this medicine is safe to use if you have any skin conditions or allergies. Ask a doctor before using this medicine if you are pregnant or breastfeeding. Do not use this medicine on a child without medical advice. How should I use benzoyl peroxide topical?  Benzoyl peroxide topical can cause a rare but serious allergic reaction or severe skin irritation. Before you start using this medicine, you may choose to apply a \"test dose\" to see if you have a reaction. Apply a very small amount of the medicine to 1 or 2 small acne areas every day for 3 days in a row.  If there is no reaction, begin using the full prescribed amount on the 4th day. Use exactly as directed on the label, or as prescribed by your doctor. Do not take by mouth. Topical medicine is for use only on the skin. Do not use on open wounds or on sunburned, windburned, dry, or irritated skin. Also avoid using benzoyl peroxide topical on wounds or on areas of eczema. Wait until these conditions have healed before using this medication. Wash your hands before and after applying this medication. Clean and pat dry the skin to be treated. Apply benzoyl peroxide in a thin layer and rub in gently. Read and carefully follow any Instructions for Use provided with your medicine. Ask your doctor or pharmacist if you do not understand these instructions. You may need to shake the medicine just before each use. Do not cover the treated skin area unless your doctor has told you to. Benzoyl peroxide may bleach hair or fabrics. Avoid allowing this medication to come into contact with your hair or clothing. It may take several weeks before your symptoms improve. Keep using the medication as directed and tell your doctor if your symptoms do not improve. Store at room temperature away from moisture and heat. What happens if I miss a dose? Apply the medicine as soon as you can, but skip the missed dose if it is almost time for your next dose. Do not apply two doses at one time. What happens if I overdose? Seek emergency medical attention or call the Poison Help line at 1-383.558.6284. What should I avoid while using benzoyl peroxide topical?  Rinse with water if this medicine gets in your eyes or mouth. Avoid using skin products that can cause irritation, such as harsh soaps, shampoos, hair coloring or permanent chemicals, hair removers or waxes, or skin products with alcohol, spices, astringents, or lime. Avoid exposure to sunlight or tanning beds.  Wear protective clothing and use sunscreen (SPF 30 or higher) when you are outdoors. What are the possible side effects of benzoyl peroxide topical?  Benzoyl peroxide topical can cause a rare but serious allergic reaction or severe skin irritation. These reactions may occur just a few minutes after you apply the medicine, or within a day or longer afterward. Stop using this medicine and get emergency medical help if you have signs of an allergic reaction: hives, itching; difficult breathing, feeling light-headed; swelling of your face, lips, tongue, or throat. Stop using benzoyl peroxide and call your doctor at once if you have any of these side effects on the treated skin:  · severe itching or burning;  · severe stinging or redness;  · swelling; or  · peeling. Common side effects may include:  · mild stinging or burning;  · itching or tingly feeling;  · skin dryness, peeling, or flaking; or  · redness or other irritation. This is not a complete list of side effects and others may occur. Call your doctor for medical advice about side effects. You may report side effects to FDA at 6-593-FDA-4434. What other drugs will affect benzoyl peroxide topical?  Applying benzoyl peroxide while you are also using tretinoin topical medicine may cause severe skin irritation. Brands that contain tretinoin include Avita, Renova, Retin-A, Tretin-X, and others. Medicine used on the skin is not likely to be affected by other drugs you use. But many drugs can interact with each other. Tell each of your health care providers about all medicines you use, including prescription and over-the-counter medicines, vitamins, and herbal products. Where can I get more information? Your pharmacist can provide more information about benzoyl peroxide topical.  Remember, keep this and all other medicines out of the reach of children, never share your medicines with others, and use this medication only for the indication prescribed.    Every effort has been made to ensure that the information provided by [employer] A virus makes the top layer of skin grow quickly, causing a wart. Warts usually go away on their own in months or years. Warts are spread easily. You can infect yourself again by touching the wart and then touching another part of your body. You also can infect others by sharing towels, razors, or other personal items. Most plantar warts do not need treatment. But if warts cause you pain or spread, your doctor may recommend that you use an over-the-counter treatment. These include salicylic acid or duct tape. Your doctor may prescribe a stronger medicine to put on warts or may inject them with medicine. Your doctor also can remove warts through surgery or by freezing them. Follow-up care is a key part of your treatment and safety. Be sure to make and go to all appointments, and call your doctor if you are having problems. It's also a good idea to know your test results and keep a list of the medicines you take. How can you care for yourself at home? · Use salicylic acid or duct tape as your doctor directs. You put the medicine or the tape on a wart for a while and then file down the dead skin on the wart. You use the salicylic acid treatment for 2 to 3 months or the tape for 1 to 2 months. · If your doctor prescribes medicine to put on warts, use it exactly as prescribed. Call your doctor if you think you are having a problem with your medicine. · Wear comfortable shoes and socks. Avoid high heels or shoes that put a lot of pressure on your foot. · Pad the wart with doughnut-shaped felt or a moleskin patch. You can buy these at a drugstore. Put the pad around the plantar wart so that it relieves pressure on the wart. You also can place pads or cushions in your shoes to make walking more comfortable. · Take an over-the-counter medicine, such as acetaminophen (Tylenol), ibuprofen (Advil, Motrin), or naproxen (Aleve) if you have pain. Read and follow all instructions on the label.   · Do not take two or more pain medicines at the same time unless the doctor told you to. Many pain medicines have acetaminophen, which is Tylenol. Too much acetaminophen (Tylenol) can be harmful. When should you call for help? Call your doctor now or seek immediate medical care if:    · You have signs of infection, such as:  ? Increased pain, swelling, warmth, or redness. ? Red streaks leading from a wart. ? Pus draining from a wart. ? A fever. Watch closely for changes in your health, and be sure to contact your doctor if:    · You do not get better as expected. Where can you learn more? Go to https://GOSOpeSiena Collegeeb.NuoDB. org and sign in to your PostalGuard account. Enter S429 in the [employer] box to learn more about \"Plantar Warts: Care Instructions. \"     If you do not have an account, please click on the \"Sign Up Now\" link. Current as of: July 2, 2020               Content Version: 12.8  © 2006-2021 [employer], Incorporated. Care instructions adapted under license by [facility] ([facility]). If you have questions about a medical condition or this instruction, always ask your healthcare professional. [place] [address] any warranty or liability for your use of this information.
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
## 2021-06-16 PROBLEM — F17.200 SMOKER: Status: ACTIVE | Noted: 2021-06-16

## 2021-06-16 PROBLEM — Z13.31 POSITIVE DEPRESSION SCREENING: Status: ACTIVE | Noted: 2021-06-16

## 2021-06-16 PROBLEM — B07.0 PLANTAR WARTS: Status: ACTIVE | Noted: 2021-06-16

## 2021-06-16 PROBLEM — L70.9 ACNE: Status: ACTIVE | Noted: 2021-06-16

## 2021-06-16 PROBLEM — N91.2 ABSENT MENSES: Status: ACTIVE | Noted: 2021-06-16

## 2021-06-16 PROBLEM — F12.20 CANNABIS DEPENDENCE, DAILY USE (HCC): Status: ACTIVE | Noted: 2021-06-16

## 2021-06-16 NOTE — ASSESSMENT & PLAN NOTE
Patient is encouraged to quit smoking but she states she has been doing so for the last 20 years and she does not plan on quitting

## 2021-07-14 ENCOUNTER — OFFICE VISIT (OUTPATIENT)
Dept: PODIATRY | Age: 41
End: 2021-07-14
Payer: COMMERCIAL

## 2021-07-14 VITALS
HEART RATE: 84 BPM | DIASTOLIC BLOOD PRESSURE: 70 MMHG | BODY MASS INDEX: 35.73 KG/M2 | SYSTOLIC BLOOD PRESSURE: 122 MMHG | WEIGHT: 186 LBS | RESPIRATION RATE: 20 BRPM

## 2021-07-14 DIAGNOSIS — M79.671 FOOT PAIN, BILATERAL: ICD-10-CM

## 2021-07-14 DIAGNOSIS — M79.675 PAIN IN TOES OF BOTH FEET: Primary | ICD-10-CM

## 2021-07-14 DIAGNOSIS — M79.672 FOOT PAIN, BILATERAL: ICD-10-CM

## 2021-07-14 DIAGNOSIS — M79.674 PAIN IN TOES OF BOTH FEET: Primary | ICD-10-CM

## 2021-07-14 DIAGNOSIS — M20.60 DEFORMITY, TOE ACQUIRED, UNSPECIFIED LATERALITY: ICD-10-CM

## 2021-07-14 DIAGNOSIS — B07.0 PLANTAR WART OF BOTH FEET: ICD-10-CM

## 2021-07-14 PROCEDURE — 99203 OFFICE O/P NEW LOW 30 MIN: CPT | Performed by: PODIATRIST

## 2021-07-14 PROCEDURE — 17110 DESTRUCTION B9 LES UP TO 14: CPT | Performed by: PODIATRIST

## 2021-07-14 NOTE — PROGRESS NOTES
Subjective:  Leola Tapia is a 46 y.o. female who presents to the office today complaining of a wart on the Bilateral foot. Symptoms began month(s) ago. Patient relates pain is Present. Pain is rated 5 out of 10 and is described as intermittent. Treatments prior to today's visit include: none. Currently denies F/C/N/V. Allergies   Allergen Reactions    Penicillins Hives, Shortness Of Breath and Swelling       Past Medical History:   Diagnosis Date    ADHD     Asthma     Bipolar disorder (HCC)     Dysplasia of cervix     GERD (gastroesophageal reflux disease)     Hot flashes     Hypoglycemia     Manic-depressive psychosis, circular, not spec as manic or depress     PCOS (polycystic ovarian syndrome)     PTSD (post-traumatic stress disorder)     PTSD (post-traumatic stress disorder)        Prior to Admission medications    Medication Sig Start Date End Date Taking?  Authorizing Provider   sucralfate (CARAFATE) 1 GM tablet TAKE 1 TABLET BY MOUTH EVERY 6 HOURS 3/15/21  Yes Historical Provider, MD   acetaminophen (TYLENOL) 325 MG tablet Take 650 mg by mouth every 6 hours as needed for Pain Indications: prn   Yes Historical Provider, MD   NONFORMULARY Indications: cloratab    Yes Historical Provider, MD   NONFORMULARY Indications: nasal spray equate    Yes Historical Provider, MD   senna (SENOKOT) 8.6 MG tablet Take 1 tablet by mouth daily   Yes Historical Provider, MD   omeprazole (PRILOSEC) 20 MG delayed release capsule Take 20 mg by mouth daily Took a few days ago because she cant keep anything down   Yes Historical Provider, MD   ondansetron (ZOFRAN ODT) 4 MG disintegrating tablet Take 1 tablet by mouth every 8 hours as needed for Nausea 3/2/21  Yes Robyn Hsu MD   cyclobenzaprine (FLEXERIL) 5 MG tablet Take 1 tablet by mouth nightly as needed for Muscle spasms  Patient not taking: Reported on 7/14/2021 1/9/21   Shannon Reynoso MD       Past Surgical History:   Procedure Laterality Date    CERVIX SURGERY      cone     UPPER GASTROINTESTINAL ENDOSCOPY  03/15/2021    No significant pathology       Family History   Adopted: Yes   Problem Relation Age of Onset    Other Father        Social History     Tobacco Use    Smoking status: Current Every Day Smoker     Packs/day: 0.50     Years: 0.00     Pack years: 0.00     Types: Cigarettes     Start date: 3/2/2000    Smokeless tobacco: Never Used   Substance Use Topics    Alcohol use: Yes     Alcohol/week: 7.0 - 8.0 standard drinks     Types: 1 Glasses of wine, 6 - 7 Cans of beer per week     Comment: maybe monthy a few shots       ROS: All 14 ROS systems reviewed and pertinent positives noted above, all others negative. Lower Extremity Physical Examination:     Vitals:   Vitals:    07/14/21 1452   BP: 122/70   Pulse: 84   Resp: 20     General: AAO x 3 in NAD. Vascular: DP and PT pulses palpable 2/4, bilateral.  CFT <3 seconds, bilateral.  Hair growth present to the level of the digits, bilateral.  Edema absent, bilateral.  Varicosities absent, bilateral. Erythema absent, bilateral. Distal Rubor absent bilateral.  Temperature within normal limits bilateral. Hyperpigmentation absent bilateral. Atrophic skin no. Neurological: Sensation intact to light touch to level of digits, bilateral.  Protective sensation intact 10/10 sites via 5.07/10g [place]-Debbie Monofilament, bilateral.  negative Tinel's, bilateral.  negative Valleix sign, bilateral.  Vibratory intact bilateral.  Reflexes Decreased bilateral.  Paresthesias negative. Dysthesias negative. Sharp/dull intact bilateral.  Musculoskeletal: Muscle strength 5/5, bilateral.  Pain is Absent  with lateral compression of the lesion.   Ankle ROM within normal limits,bilateral.  1st MPJ ROM within normal limits, bilateral.  Elongated second toes bilateral   integument: Warm, dry, supple, bilateral.  Open lesion absent, bilateral.  Interdigital maceration absent to web spaces bilateral.  Nails within normal limits. Fissures absent, bilateral. Verrucous tissue present Right with loss of skin lines and pin point bleeding upon debridement. Seen subleft first ray subright forefoot and distal lateral right third toe. Assessment:   Diagnosis Orders   1. Pain in toes of both feet     2. Deformity, toe acquired, unspecified laterality     3. Plantar wart of both feet     4. Foot pain, bilateral           Plan:  1. Patients condition discussed and all questions answered. 2. A #10 blade and tissue nippers were used to debride all non viable and verrucous tissue. Patient had canthadrin applied for destruction of the lesion. This may be a staged treatment based on how patient responds. Patient will leave this covered for 24 hours then will start OTC wart acid once per day and a drying agent once per day. A Rx was given for Lazerformalyde of aluminium cholride (drysol) x1 bottle, #1, apply qd. 3.  Patient will follow up in 1month(s). Due to the length parent toes patient is advised appropriate fitting shoes. Patient should try digital buttress pad versus gel For the toes when active. Patient low level medical decision making overall. 2 stable chronic conditions. 1 new prescription. No new testing.

## 2021-07-14 NOTE — PATIENT INSTRUCTIONS
Urea 40% Cream - [employer]   Patient will leave this covered for 24 hours then will start OTC wart acid once per day and a drying agent (Rx lazerformalyde/aluminum chloride) once per day. One in the am and one in the PM.  Continue until follow up in 3 weeks.

## 2021-08-07 LAB
ALBUMIN/GLOBULIN RATIO: 1.7 G/DL
ALBUMIN: 4.1 G/DL (ref 3.5–5)
ALP BLD-CCNC: 78 UNITS/L (ref 38–126)
ALT SERPL-CCNC: 19 UNITS/L (ref 4–35)
ANION GAP SERPL CALCULATED.3IONS-SCNC: 6.9 MMOL/L
AST SERPL-CCNC: 20 UNITS/L (ref 14–36)
BACTERIA, URINE: ABNORMAL
BASOPHILS %: 1.59 (ref 0–3)
BASOPHILS ABSOLUTE: 0.11 (ref 0–0.3)
BILIRUB SERPL-MCNC: 0.7 MG/DL (ref 0.2–1.3)
BILIRUBIN URINE: NEGATIVE
BLOOD, URINE: NEGATIVE
BUN BLDV-MCNC: 22 MG/DL (ref 7–17)
CALCIUM SERPL-MCNC: 9.7 MG/DL (ref 8.4–10.2)
CASTS UA: ABNORMAL
CHLORIDE BLD-SCNC: 105 MMOL/L (ref 98–120)
CHOLESTEROL/HDL RATIO: 2.72 RATIO (ref 0–4.5)
CHOLESTEROL: 166 MG/DL (ref 50–200)
CLARITY: ABNORMAL
CO2: 27 MMOL/L (ref 22–31)
COLOR, URINE: YELLOW
CREAT SERPL-MCNC: 0.8 MG/DL (ref 0.5–1)
CRYSTALS, UA: ABNORMAL
EOSINOPHILS %: 2.84 (ref 0–10)
EOSINOPHILS ABSOLUTE: 0.19 (ref 0–1.1)
GFR CALCULATED: > 60
GLOBULIN: 2.4 G/DL
GLUCOSE URINE: NEGATIVE MG/DL
GLUCOSE: 88 MG/DL (ref 65–105)
HCG QUALITATIVE: NEGATIVE
HCT VFR BLD CALC: 45 % (ref 37–47)
HDLC SERPL-MCNC: 61 MG/DL (ref 36–68)
HEMOGLOBIN: 15 (ref 12–16)
KETONES, URINE: NEGATIVE MG/DL
LDL CHOLESTEROL CALCULATED: 94.6 MG/DL (ref 0–160)
LEUKOCYTE ESTERASE, URINE: NEGATIVE
LYMPHOCYTE %: 32.86 (ref 20–51.1)
LYMPHOCYTES ABSOLUTE: 2.25 (ref 1–5.5)
MCH RBC QN AUTO: 30 PG (ref 28.5–32.5)
MCHC RBC AUTO-ENTMCNC: 33.4 G/DL (ref 32–37)
MCV RBC AUTO: 90 FL (ref 80–94)
MONOCYTES %: 9.43 (ref 1.7–9.3)
MONOCYTES ABSOLUTE: 0.65 (ref 0.1–1)
MUCUS, URINE: ABNORMAL
NEUTROPHILS %: 53.28 (ref 42.2–75.2)
NEUTROPHILS ABSOLUTE: 3.64 (ref 2–8.1)
NITRITE, URINE: NEGATIVE
PDW BLD-RTO: 11.5 % (ref 8.5–15.5)
PH UA: 7 (ref 5–8.5)
PLATELET # BLD: 289 THOU/MM3 (ref 130–400)
POTASSIUM SERPL-SCNC: 4.5 MMOL/L (ref 3.6–5)
PROTEIN UA: NEGATIVE MG/DL
RBC URINE: ABNORMAL (ref 0–2)
RBC: 5 M/UL (ref 4.2–5.4)
SODIUM BLD-SCNC: 139 MMOL/L (ref 135–145)
SPECIFIC GRAVITY, URINE: 1.02 MG/DL (ref 1–1.03)
SQUAMOUS EPITHELIAL: ABNORMAL
T4 FREE: 1.15 NG/DL (ref 0.78–2.19)
TOTAL PROTEIN, SERUM: 6.5 G/DL (ref 6.3–8.2)
TRICHOMONAS, URINE: ABNORMAL
TRIGL SERPL-MCNC: 52 MG/DL (ref 10–250)
TSH SERPL DL<=0.05 MIU/L-ACNC: 1.54 MIU/ML (ref 0.49–4.67)
UROBILINOGEN, URINE: 0.2 MG/DL (ref 0.2–1)
VITAMIN D 25-HYDROXY: 26.2 NG/ML (ref 30–100)
VLDLC SERPL CALC-MCNC: 10 MG/DL (ref 0–50)
WBC URINE: ABNORMAL (ref 0–4)
WBC: 6.8 THOU/ML3 (ref 4.8–10.8)
YEAST, URINE: ABNORMAL

## 2021-08-08 LAB
ESTRADIOL LEVEL: 16 PG/ML (ref 27–314)
FOLLICLE STIMULATING HORMONE: 66.8 U/L (ref 1.7–21.5)
PROLACTIN: 8.93 UG/L (ref 4.79–23.3)

## 2021-08-09 ENCOUNTER — HOSPITAL ENCOUNTER (OUTPATIENT)
Age: 41
Setting detail: SPECIMEN
Discharge: HOME | End: 2021-08-09
Payer: COMMERCIAL

## 2021-08-09 ENCOUNTER — OFFICE VISIT (OUTPATIENT)
Dept: FAMILY MEDICINE CLINIC | Age: 41
End: 2021-08-09
Payer: COMMERCIAL

## 2021-08-09 VITALS
SYSTOLIC BLOOD PRESSURE: 118 MMHG | BODY MASS INDEX: 35.34 KG/M2 | TEMPERATURE: 98.2 F | OXYGEN SATURATION: 98 % | HEART RATE: 86 BPM | HEIGHT: 60 IN | DIASTOLIC BLOOD PRESSURE: 72 MMHG | WEIGHT: 180 LBS

## 2021-08-09 DIAGNOSIS — Z01.419 ENCOUNTER FOR ANNUAL ROUTINE GYNECOLOGICAL EXAMINATION: ICD-10-CM

## 2021-08-09 DIAGNOSIS — E55.9 VITAMIN D DEFICIENCY: ICD-10-CM

## 2021-08-09 DIAGNOSIS — Z11.3 ROUTINE SCREENING FOR STI (SEXUALLY TRANSMITTED INFECTION): ICD-10-CM

## 2021-08-09 DIAGNOSIS — E28.319 EARLY MENOPAUSE OCCURRING IN PATIENT AGE YOUNGER THAN 45 YEARS: ICD-10-CM

## 2021-08-09 DIAGNOSIS — Z01.419 ENCOUNTER FOR ANNUAL ROUTINE GYNECOLOGICAL EXAMINATION: Primary | ICD-10-CM

## 2021-08-09 PROCEDURE — 87591 N.GONORRHOEAE DNA AMP PROB: CPT

## 2021-08-09 PROCEDURE — G0145 SCR C/V CYTO,THINLAYER,RESCR: HCPCS

## 2021-08-09 PROCEDURE — 99395 PREV VISIT EST AGE 18-39: CPT | Performed by: NURSE PRACTITIONER

## 2021-08-09 PROCEDURE — 87491 CHLMYD TRACH DNA AMP PROBE: CPT

## 2021-08-09 PROCEDURE — 87624 HPV HI-RISK TYP POOLED RSLT: CPT

## 2021-08-09 RX ORDER — MULTIVIT-MIN/IRON/FOLIC ACID/K 18-600-40
2000 CAPSULE ORAL DAILY
Qty: 90 CAPSULE | Refills: 3 | Status: SHIPPED | OUTPATIENT
Start: 2021-08-09

## 2021-08-09 SDOH — ECONOMIC STABILITY: FOOD INSECURITY: WITHIN THE PAST 12 MONTHS, YOU WORRIED THAT YOUR FOOD WOULD RUN OUT BEFORE YOU GOT MONEY TO BUY MORE.: NEVER TRUE

## 2021-08-09 SDOH — ECONOMIC STABILITY: FOOD INSECURITY: WITHIN THE PAST 12 MONTHS, THE FOOD YOU BOUGHT JUST DIDN'T LAST AND YOU DIDN'T HAVE MONEY TO GET MORE.: NEVER TRUE

## 2021-08-09 ASSESSMENT — SOCIAL DETERMINANTS OF HEALTH (SDOH): HOW HARD IS IT FOR YOU TO PAY FOR THE VERY BASICS LIKE FOOD, HOUSING, MEDICAL CARE, AND HEATING?: NOT HARD AT ALL

## 2021-08-09 NOTE — PROGRESS NOTES
Annual GYN Visit      Valeria Avendaño  YOB: 1980    Date of Service:  2021    Chief Complaint:   Valeria Avendaño is a 46 y.o. female who presents for routine annual gynecologic visit. HPI:  L0 Patient is postmenopausal- No LMP recorded. (Menstrual status: Irregular periods). .  She complains of amenorrhea. Menopausal/perimenopausal symptoms: none, hot flashes. Patient recently had blood work done which shows she is menopausal.  Patient reports she has not had a menses since 2018. She is very happy about this. She states she has black cohosh to try for her hot flashes. Allergies   Allergen Reactions    Latex Itching, Rash and Swelling    Penicillins Hives, Shortness Of Breath and Swelling    Aluminum Anxiety, Dermatitis, Hives, Itching, Rash and Swelling    Silver Other (See Comments), Photosensitivity, Rash and Swelling     Outpatient Medications Marked as Taking for the 21 encounter (Office Visit) with JONELLE Peacock - CNP   Medication Sig Dispense Refill    aluminum chloride (DRYSOL) 20 % external solution Apply topically nightly.  1 Bottle 0    sucralfate (CARAFATE) 1 GM tablet TAKE 1 TABLET BY MOUTH EVERY 6 HOURS      acetaminophen (TYLENOL) 325 MG tablet Take 650 mg by mouth every 6 hours as needed for Pain Indications: prn      NONFORMULARY Indications: cloratab       NONFORMULARY Indications: nasal spray equate       senna (SENOKOT) 8.6 MG tablet Take 1 tablet by mouth daily      omeprazole (PRILOSEC) 20 MG delayed release capsule Take 20 mg by mouth daily Took a few days ago because she cant keep anything down      ondansetron (ZOFRAN ODT) 4 MG disintegrating tablet Take 1 tablet by mouth every 8 hours as needed for Nausea 20 tablet 0       Preventive Care and Risk Factor Assessment  Health Maintenance   Topic Date Due    Hepatitis C screen  Never done    Varicella vaccine (1 of 2 - 2-dose childhood series) Never done    Pneumococcal 0-64 years Vaccine (1 of 2 - PPSV23) Never done    COVID-19 Vaccine (1) Never done    HIV screen  Never done    DTaP/Tdap/Td vaccine (1 - Tdap) Never done    Cervical cancer screen  Never done    Flu vaccine (1) 09/01/2021    Hepatitis A vaccine  Aged Out    Hepatitis B vaccine  Aged Out    Hib vaccine  Aged Out    Meningococcal (ACWY) vaccine  Aged Out      Hx abnormal PAP: yes - 2012 had cone biopsy  Sexual activity: single partner, contraception - none. Hx of STD: yes - chlamydia and gonorrhea when younger  Hx of abnormal mammogram: no  Self-breast exams: yes-patient states she leaks blue fluid which has been looked at by pathology  FH of breast cancer: no  FH of GYN cancer: no   Previous DEXA scan: no  Exercise: no regular exercise  Social History     Tobacco Use   Smoking Status Current Every Day Smoker    Packs/day: 0.50    Years: 0.00    Pack years: 0.00    Types: Cigarettes    Start date: 3/2/2000   Smokeless Tobacco Never Used      Social History     Substance and Sexual Activity   Alcohol Use Yes    Alcohol/week: 7.0 - 8.0 standard drinks    Types: 1 Glasses of wine, 6 - 7 Cans of beer per week    Comment: maybe monthy a few shots          There is no immunization history on file for this patient. Review of Systems:  A comprehensive review of systems was negative except for what was noted in the HPI. Wt Readings from Last 3 Encounters:   08/09/21 180 lb (81.6 kg)   07/14/21 186 lb (84.4 kg)   06/15/21 176 lb 3.2 oz (79.9 kg)     BP Readings from Last 3 Encounters:   08/09/21 118/72   07/14/21 122/70   06/15/21 110/70       Physical Exam:  Vitals:    08/09/21 1456   BP: 118/72   Site: Right Upper Arm   Position: Sitting   Cuff Size: Large Adult   Pulse: 86   Temp: 98.2 °F (36.8 °C)   TempSrc: Tympanic   SpO2: 98%   Weight: 180 lb (81.6 kg)   Height: 5' (1.524 m)      Body mass index is 35.15 kg/m². Constitutional: She is oriented to person, place, and time.  She appears well-developed and well-nourished. No distress. Neck: No mass and no thyromegaly present. Cardiovascular: Normal rate, regular rhythm and normal heart sounds. No murmur heard. Pulmonary/Chest: Effort and breath sounds normal.   Abdominal: Soft, non-tender. No distension or masses. Genitourinary: normal external genitalia, vulva, vagina, cervix, uterus and adnexa. Breast exam:  breasts appear normal, no suspicious masses, no skin or nipple changes or axillary nodes. Neurological: She is alert and oriented to person, place, and time. Skin: Skin is warm and dry. No rash noted. No erythema. Psychiatric: She has a normal mood and affect. Her behavior is normal.     Assessment/Plan:  Susan Levy was seen today for gynecologic exam.    Diagnoses and all orders for this visit:    Encounter for annual routine gynecological examination  -     PAP SMEAR; Future    Routine screening for STI (sexually transmitted infection)  -     Chlamydia Trachomatis & Neisseria gonorrhoeae (GC) by amplified detection; Future    Vitamin D deficiency  -     Vitamin D, Cholecalciferol, 50 MCG (2000 UT) CAPS; Take 2,000 Units by mouth daily    Early menopause occurring in patient age younger than 39 years      Return in about 1 year (around 8/9/2022), or if symptoms worsen or fail to improve. Orders Placed This Encounter   Medications    Vitamin D, Cholecalciferol, 50 MCG (2000 UT) CAPS     Sig: Take 2,000 Units by mouth daily     Dispense:  90 capsule     Refill:  3     Orders Placed This Encounter   Procedures    Chlamydia Trachomatis & Neisseria gonorrhoeae (GC) by amplified detection     Standing Status:   Future     Standing Expiration Date:   8/9/2022    PAP SMEAR     Patient History:    No LMP recorded. (Menstrual status: Irregular periods).   OBGYN Status: Irregular periods  Past Surgical History:  No date: CERVIX SURGERY      Comment:  cone   03/15/2021: UPPER GASTROINTESTINAL ENDOSCOPY      Comment:  No significant pathology      Social History    Tobacco Use      Smoking status: Current Every Day Smoker        Packs/day: 0.50        Years: 0.00        Pack years: 0        Types: Cigarettes        Start date: 3/2/2000      Smokeless tobacco: Never Used       Standing Status:   Future     Standing Expiration Date:   8/9/2022     Order Specific Question:   Collection Type     Answer: Thin Prep     Order Specific Question:   Prior Abnormal Pap Test     Answer:   No     Order Specific Question:   Screening or Diagnostic     Answer:   Screening     Order Specific Question:   HPV Requested? Answer:   Yes     Order Specific Question:   High Risk Patient     Answer:   N/A       Patient given educational materials - see patient instructions. Discussed use, benefit, and side effects of prescribed medications. All patient questions answered. Pt voiced understanding. Reviewed health maintenance. Instructed to continue current medications, diet and exercise.       Electronically signed by JONELLE Lacey CNP on 8/9/2021 at 4:48 PM

## 2021-08-11 ENCOUNTER — OFFICE VISIT (OUTPATIENT)
Dept: PODIATRY | Age: 41
End: 2021-08-11
Payer: COMMERCIAL

## 2021-08-11 VITALS
WEIGHT: 181 LBS | DIASTOLIC BLOOD PRESSURE: 80 MMHG | HEART RATE: 80 BPM | BODY MASS INDEX: 35.35 KG/M2 | SYSTOLIC BLOOD PRESSURE: 132 MMHG | RESPIRATION RATE: 20 BRPM

## 2021-08-11 DIAGNOSIS — M79.671 FOOT PAIN, BILATERAL: ICD-10-CM

## 2021-08-11 DIAGNOSIS — M79.672 FOOT PAIN, BILATERAL: ICD-10-CM

## 2021-08-11 DIAGNOSIS — B07.0 PLANTAR WART OF BOTH FEET: Primary | ICD-10-CM

## 2021-08-11 PROCEDURE — 17110 DESTRUCTION B9 LES UP TO 14: CPT | Performed by: PODIATRIST

## 2021-08-11 PROCEDURE — 99999 PR OFFICE/OUTPT VISIT,PROCEDURE ONLY: CPT | Performed by: PODIATRIST

## 2021-08-11 NOTE — PROGRESS NOTES
Subjective:  Ganesh Alvarado is a 46 y.o. female who presents to the office today complaining of a wart on the Bilateral foot. Symptoms similar overall. .  Patient relates pain is Present. Pain is rated 3 out of 10 and is described as intermittent. Currently denies F/C/N/V. Allergies   Allergen Reactions    Latex Itching, Rash and Swelling    Penicillins Hives, Shortness Of Breath and Swelling    Aluminum Anxiety, Dermatitis, Hives, Itching, Rash and Swelling    Silver Other (See Comments), Photosensitivity, Rash and Swelling       Past Medical History:   Diagnosis Date    ADHD     Asthma     Bipolar disorder ([facility] [address]     Dysplasia of cervix     GERD (gastroesophageal reflux disease)     Hot flashes     Hypoglycemia     Manic-depressive psychosis, circular, not spec as manic or depress     PCOS (polycystic ovarian syndrome)     PTSD (post-traumatic stress disorder)     PTSD (post-traumatic stress disorder)        Prior to Admission medications    Medication Sig Start Date End Date Taking? Authorizing Provider   Vitamin D, Cholecalciferol, 50 MCG (2000 UT) CAPS Take 2,000 Units by mouth daily 8/9/21  Yes JONELLE Abernathy CNP   aluminum chloride (DRYSOL) 20 % external solution Apply topically nightly.  7/14/21  Yes Meri Duarte DPM   sucralfate (CARAFATE) 1 GM tablet TAKE 1 TABLET BY MOUTH EVERY 6 HOURS 3/15/21  Yes Historical Provider, MD   acetaminophen (TYLENOL) 325 MG tablet Take 650 mg by mouth every 6 hours as needed for Pain Indications: prn   Yes Historical Provider, MD   NONFORMULARY Indications: cloratab    Yes Historical Provider, MD   NONFORMULARY Indications: nasal spray equate    Yes Historical Provider, MD   senna (SENOKOT) 8.6 MG tablet Take 1 tablet by mouth daily   Yes Historical Provider, MD   omeprazole (PRILOSEC) 20 MG delayed release capsule Take 20 mg by mouth daily Took a few days ago because she cant keep anything down   Yes Historical Provider, MD   ondansetron (ZOFRAN ODT) 4 MG disintegrating tablet Take 1 tablet by mouth every 8 hours as needed for Nausea 3/2/21  Yes Maria Liu MD       Past Surgical History:   Procedure Laterality Date    CERVIX SURGERY      cone     UPPER GASTROINTESTINAL ENDOSCOPY  03/15/2021    No significant pathology       Family History   Adopted: Yes   Problem Relation Age of Onset    Other Father        Social History     Tobacco Use    Smoking status: Current Every Day Smoker     Packs/day: 0.50     Years: 0.00     Pack years: 0.00     Types: Cigarettes     Start date: 3/2/2000    Smokeless tobacco: Never Used   Substance Use Topics    Alcohol use: Yes     Alcohol/week: 7.0 - 8.0 standard drinks     Types: 1 Glasses of wine, 6 - 7 Cans of beer per week     Comment: maybe monthy a few shots       ROS: All 14 ROS systems reviewed and pertinent positives noted above, all others negative. Lower Extremity Physical Examination:     Vitals:   Vitals:    08/11/21 1450   BP: 132/80   Pulse: 80   Resp: 20     General: AAO x 3 in NAD. Vascular: DP and PT pulses palpable 2/4, bilateral.  CFT <3 seconds, bilateral.  Hair growth present to the level of the digits, bilateral.  Edema absent, bilateral.  Varicosities absent, bilateral. Erythema absent, bilateral. Distal Rubor absent bilateral.  Temperature within normal limits bilateral. Hyperpigmentation absent bilateral. Atrophic skin no. Neurological: Sensation intact to light touch to level of digits, bilateral.  Protective sensation intact 10/10 sites via 5.07/10g [place]-Debbie Monofilament, bilateral.  negative Tinel's, bilateral.  negative Valleix sign, bilateral.  Vibratory intact bilateral.  Reflexes Decreased bilateral.  Paresthesias negative. Dysthesias negative. Sharp/dull intact bilateral.  Musculoskeletal: Muscle strength 5/5, bilateral.  Pain is Absent  with lateral compression of the lesion.   Ankle ROM within normal limits,bilateral.  1st MPJ ROM within normal limits, bilateral.  Elongated second toes bilateral   integument: Warm, dry, supple, bilateral.  Open lesion absent, bilateral.  Interdigital maceration absent to web spaces bilateral.  Nails within normal limits. Fissures absent, bilateral. Verrucous tissue present Right with loss of skin lines and pin point bleeding upon debridement. Seen subleft first ray subright forefoot and distal lateral right third toe. Areas not as involved looking from last visit. Assessment:   Diagnosis Orders   1. Plantar wart of both feet     2. Foot pain, bilateral           Plan:  1. Patients condition discussed and all questions answered. 2. A #10 blade and tissue nippers were used to debride all non viable and verrucous tissue. Patient had canthadrin applied for destruction of the lesion. This may be a staged treatment based on how patient responds. Patient will leave this covered for 24 hours then will start OTC wart acid once per day and a drying agent once per day. Stop drysol. Pt thinks her skin reacts to it. 3.  Patient will follow up in 1month(s).

## 2021-08-12 LAB
C TRACH DNA GENITAL QL NAA+PROBE: NEGATIVE
HPV SAMPLE: NORMAL
HPV, GENOTYPE 16: NOT DETECTED
HPV, GENOTYPE 18: NOT DETECTED
HPV, HIGH RISK OTHER: NOT DETECTED
HPV, INTERPRETATION: NORMAL
N. GONORRHOEAE DNA: NEGATIVE
SPECIMEN DESCRIPTION: NORMAL
SPECIMEN DESCRIPTION: NORMAL

## 2021-08-12 RX ORDER — NITROFURANTOIN 25; 75 MG/1; MG/1
100 CAPSULE ORAL 2 TIMES DAILY
Qty: 10 CAPSULE | Refills: 0 | Status: SHIPPED | OUTPATIENT
Start: 2021-08-12 | End: 2021-08-17

## 2021-08-18 LAB — CYTOLOGY REPORT: NORMAL

## 2021-11-10 ENCOUNTER — OFFICE VISIT (OUTPATIENT)
Dept: FAMILY MEDICINE CLINIC | Age: 41
End: 2021-11-10
Payer: COMMERCIAL

## 2021-11-10 VITALS
WEIGHT: 189 LBS | DIASTOLIC BLOOD PRESSURE: 86 MMHG | HEART RATE: 96 BPM | HEIGHT: 61 IN | SYSTOLIC BLOOD PRESSURE: 122 MMHG | TEMPERATURE: 98.4 F | BODY MASS INDEX: 35.68 KG/M2 | OXYGEN SATURATION: 98 %

## 2021-11-10 DIAGNOSIS — M25.562 ACUTE PAIN OF LEFT KNEE: Primary | ICD-10-CM

## 2021-11-10 PROCEDURE — 99213 OFFICE O/P EST LOW 20 MIN: CPT | Performed by: NURSE PRACTITIONER

## 2021-11-10 NOTE — PATIENT INSTRUCTIONS

## 2021-11-10 NOTE — PROGRESS NOTES
Carlos Chow (:  1980) is a 46 y.o. female,Established patient, here for evaluation of the following chief complaint(s):  Knee Pain (pt have concerns with low sugar )         ASSESSMENT/PLAN:  1. Acute pain of left knee  -     External Referral To Physical Therapy  -     XR KNEE LEFT (3 VIEWS); Future  - Continue ice and NSAIDs    - Patient is instructed to stop Monster Drinks and monitor symptoms    Return in about 6 weeks (around 2021), or if symptoms worsen or fail to improve. Subjective   SUBJECTIVE/OBJECTIVE:  Knee Pain   The incident occurred more than 1 week ago (2 weeks). The injury mechanism was a twisting injury. The pain is present in the left knee. The quality of the pain is described as shooting. The symptoms are aggravated by weight bearing, palpation and movement. She has tried immobilization, ice and acetaminophen for the symptoms. The treatment provided mild relief. Patient was pushing a grocery cart and went to reach for an item and she felt a twist and pop in her knee. She has been wearing an immobilization brace as she continues to work. The pain gets worse as the day goes on and occasionally she has a shooting pain that travels up and down her leg. Patient states she is moving to [facility] at the end of the month. Patient also wonders if her blood sugar drops after she drinks a [employer] Drink as sometimes she feels light headed shortly after consuming the drink. Review of Systems   Constitutional: Negative for fatigue and fever. HENT: Negative. Respiratory: Negative for cough and shortness of breath. Cardiovascular: Negative. Gastrointestinal: Negative. Musculoskeletal: Positive for arthralgias and gait problem. Negative for joint swelling. Psychiatric/Behavioral: Negative. Objective   Physical Exam  Vitals and nursing note reviewed. Constitutional:       Appearance: Normal appearance. She is obese.    HENT:      Head:

## 2021-11-11 ASSESSMENT — ENCOUNTER SYMPTOMS
GASTROINTESTINAL NEGATIVE: 1
COUGH: 0
SHORTNESS OF BREATH: 0